# Patient Record
Sex: FEMALE | Race: OTHER | URBAN - METROPOLITAN AREA
[De-identification: names, ages, dates, MRNs, and addresses within clinical notes are randomized per-mention and may not be internally consistent; named-entity substitution may affect disease eponyms.]

---

## 2019-04-17 ENCOUNTER — OFFICE VISIT (OUTPATIENT)
Dept: OBGYN | Age: 24
End: 2019-04-17

## 2019-04-17 VITALS
SYSTOLIC BLOOD PRESSURE: 122 MMHG | DIASTOLIC BLOOD PRESSURE: 74 MMHG | BODY MASS INDEX: 26.83 KG/M2 | WEIGHT: 177 LBS | HEIGHT: 68 IN

## 2019-04-17 DIAGNOSIS — Z30.46 ENCOUNTER FOR IMPLANON REMOVAL: Primary | ICD-10-CM

## 2019-04-17 PROCEDURE — 11982 REMOVE DRUG IMPLANT DEVICE: CPT | Performed by: OBSTETRICS & GYNECOLOGY

## 2019-04-17 RX ORDER — ETHYNODIOL DIACETATE AND ETHINYL ESTRADIOL 1 MG-35MCG
1 KIT ORAL DAILY
Qty: 1 PACKET | Refills: 3 | Status: SHIPPED | OUTPATIENT
Start: 2019-04-17

## 2019-04-17 SDOH — HEALTH STABILITY: MENTAL HEALTH: HOW OFTEN DO YOU HAVE A DRINK CONTAINING ALCOHOL?: NEVER

## 2019-04-17 NOTE — PROGRESS NOTES
PROBLEM VISIT     Date of service: 2019    Robbie Becker  Is a 24 y.o. single female    PT's PCP is: No primary care provider on file. : 1995                                             Subjective:       No LMP recorded. OB History    Para Term  AB Living   4 3 3   1 2   SAB TAB Ectopic Molar Multiple Live Births             2      # Outcome Date GA Lbr Sahil/2nd Weight Sex Delivery Anes PTL Lv   4 Term 03/10/16 40w0d   M Vag-Spont  N ARINA   3 Term 03/27/15 40w0d   F Vag-Spont  N    2 Term 13 40w0d   F Vag-Spont  N ARINA   1 AB 12 20w0d   M    FD        Social History     Tobacco Use   Smoking Status Never Smoker   Smokeless Tobacco Never Used        Social History     Substance and Sexual Activity   Alcohol Use Never    Frequency: Never       Allergies: Patient has no known allergies. No current outpatient medications on file. Social History     Substance and Sexual Activity   Sexual Activity Yes    Partners: Male       Last Yearly:      Last pap:     Last HPV: never    Chief Complaint   Patient presents with    Irregular Menses     Patient presents today with c/o Nexplanon causing pain in and down her arm and also has been bleeding since December          NURSE: KIERA    PE:  Vital Signs  Blood pressure 122/74, height 5' 8\" (1.727 m), weight 177 lb (80.3 kg). Labs:    No results found for this visit on 19. NURSE: tiffanie    HPI: The patient is here and she wishes to have her Implanon removed. It is been in for approximately 3 years and she states she is having irregular bleeding and cramping    No  PT denies fever, chills, nausea and vomiting       Objective: On exam of the left upper arm, the Implanon is in good placement. The area was thoroughly prepped with alcohol, infiltrated with 1% lidocaine a small incision in the skin was made and with the aid of fine hemostats the Implanon was readily removed without any difficulty.   Antibiotic ointment and pressure dressing were placed on the incision after removal.                           Assessment and Plan: The patient wishes to have birth control pills to help regulate her cycles. Diagnosis Orders   1. Encounter for Implanon removal               Return in about 1 month (around 5/17/2019). FF: 15 minutes    There are no Patient Instructions on file for this visit. Over 50%of time spent on counseling and care coordination on: see assessment and plan,  She was also counseled on her preventative health maintenance recommendations and follow-up.       Lora Hill,4/17/2019 2:51 PM